# Patient Record
Sex: FEMALE | Race: WHITE | Employment: FULL TIME | URBAN - METROPOLITAN AREA
[De-identification: names, ages, dates, MRNs, and addresses within clinical notes are randomized per-mention and may not be internally consistent; named-entity substitution may affect disease eponyms.]

---

## 2017-05-05 PROBLEM — E66.9 OBESITY (BMI 30.0-34.9): Status: ACTIVE | Noted: 2017-05-05

## 2017-05-05 PROBLEM — E66.811 OBESITY (BMI 30.0-34.9): Status: ACTIVE | Noted: 2017-05-05

## 2024-01-29 RX ORDER — ZONISAMIDE 50 MG/1
CAPSULE ORAL
Qty: 90 CAPSULE | Refills: 1 | Status: SHIPPED | OUTPATIENT
Start: 2024-01-29

## 2024-01-29 NOTE — TELEPHONE ENCOUNTER
Leisa Shrestha is calling to request a refill on the following medication(s):    Medication Request:  Requested Prescriptions     Pending Prescriptions Disp Refills    zonisamide (ZONEGRAN) 50 MG capsule [Pharmacy Med Name: ZONISAMIDE 50 MG CAPSULE] 90 capsule      Sig: TAKE ONE CAPSULE BY MOUTH DAILY FOR MIGRAINES       Last Visit Date (If Applicable):  Visit date not found    Next Visit Date:    Visit date not found

## 2024-04-22 ENCOUNTER — OFFICE VISIT (OUTPATIENT)
Age: 49
End: 2024-04-22

## 2024-04-22 VITALS
OXYGEN SATURATION: 100 % | DIASTOLIC BLOOD PRESSURE: 84 MMHG | HEIGHT: 65 IN | SYSTOLIC BLOOD PRESSURE: 124 MMHG | WEIGHT: 116 LBS | BODY MASS INDEX: 19.33 KG/M2 | HEART RATE: 68 BPM

## 2024-04-22 DIAGNOSIS — G57.02 PIRIFORMIS SYNDROME OF LEFT SIDE: ICD-10-CM

## 2024-04-22 DIAGNOSIS — Z12.11 SCREENING FOR COLON CANCER: ICD-10-CM

## 2024-04-22 DIAGNOSIS — M53.3 SACRAL PAIN: ICD-10-CM

## 2024-04-22 DIAGNOSIS — M54.16 ACUTE LEFT LUMBAR RADICULOPATHY: Primary | ICD-10-CM

## 2024-04-22 DIAGNOSIS — Z00.00 HEALTH MAINTENANCE EXAMINATION: ICD-10-CM

## 2024-04-22 DIAGNOSIS — E55.9 VITAMIN D DEFICIENCY: ICD-10-CM

## 2024-04-22 RX ORDER — SEMAGLUTIDE 0.25 MG/.5ML
0.25 INJECTION, SOLUTION SUBCUTANEOUS
COMMUNITY

## 2024-04-22 RX ORDER — PREDNISONE 20 MG/1
TABLET ORAL
Qty: 18 TABLET | Refills: 0 | Status: SHIPPED | OUTPATIENT
Start: 2024-04-22

## 2024-04-22 RX ORDER — CYCLOBENZAPRINE HCL 5 MG
5 TABLET ORAL 3 TIMES DAILY PRN
Qty: 30 TABLET | Refills: 0 | Status: SHIPPED | OUTPATIENT
Start: 2024-04-22 | End: 2024-05-02

## 2024-04-22 RX ORDER — METHYLPREDNISOLONE 4 MG/1
TABLET ORAL
COMMUNITY
Start: 2024-04-15

## 2024-04-22 RX ORDER — CETIRIZINE HYDROCHLORIDE 10 MG/1
10 TABLET ORAL DAILY
COMMUNITY

## 2024-04-22 RX ORDER — DOCUSATE SODIUM 100 MG/1
100 CAPSULE, LIQUID FILLED ORAL 2 TIMES DAILY
COMMUNITY

## 2024-04-22 SDOH — ECONOMIC STABILITY: FOOD INSECURITY: WITHIN THE PAST 12 MONTHS, YOU WORRIED THAT YOUR FOOD WOULD RUN OUT BEFORE YOU GOT MONEY TO BUY MORE.: NEVER TRUE

## 2024-04-22 SDOH — ECONOMIC STABILITY: FOOD INSECURITY: WITHIN THE PAST 12 MONTHS, THE FOOD YOU BOUGHT JUST DIDN'T LAST AND YOU DIDN'T HAVE MONEY TO GET MORE.: NEVER TRUE

## 2024-04-22 SDOH — ECONOMIC STABILITY: HOUSING INSECURITY
IN THE LAST 12 MONTHS, WAS THERE A TIME WHEN YOU DID NOT HAVE A STEADY PLACE TO SLEEP OR SLEPT IN A SHELTER (INCLUDING NOW)?: NO

## 2024-04-22 SDOH — ECONOMIC STABILITY: INCOME INSECURITY: HOW HARD IS IT FOR YOU TO PAY FOR THE VERY BASICS LIKE FOOD, HOUSING, MEDICAL CARE, AND HEATING?: NOT HARD AT ALL

## 2024-04-22 ASSESSMENT — PATIENT HEALTH QUESTIONNAIRE - PHQ9
SUM OF ALL RESPONSES TO PHQ QUESTIONS 1-9: 0
1. LITTLE INTEREST OR PLEASURE IN DOING THINGS: NOT AT ALL
2. FEELING DOWN, DEPRESSED OR HOPELESS: NOT AT ALL
SUM OF ALL RESPONSES TO PHQ9 QUESTIONS 1 & 2: 0

## 2024-04-22 NOTE — PROGRESS NOTES
Reported on 4/22/2024)      Semaglutide-Weight Management (WEGOVY) 0.25 MG/0.5ML SOAJ SC injection Inject 0.25 mg into the skin every 7 days (Patient not taking: Reported on 4/22/2024)      Phentermine-Topiramate (QSYMIA) 3.75-23 MG CP24 Take 1 tablet by mouth daily (Patient not taking: Reported on 4/22/2024) 14 capsule 0     No current facility-administered medications for this visit.        Patient Active Problem List   Diagnosis    Obesity (BMI 30.0-34.9)       No past medical history on file.    No past surgical history on file.     Social History     Socioeconomic History    Marital status:    Tobacco Use    Smoking status: Never     Social Determinants of Health     Financial Resource Strain: Low Risk  (4/22/2024)    Overall Financial Resource Strain (CARDIA)     Difficulty of Paying Living Expenses: Not hard at all   Food Insecurity: No Food Insecurity (4/22/2024)    Hunger Vital Sign     Worried About Running Out of Food in the Last Year: Never true     Ran Out of Food in the Last Year: Never true   Transportation Needs: Unknown (4/22/2024)    PRAPARE - Transportation     Lack of Transportation (Non-Medical): No   Housing Stability: Unknown (4/22/2024)    Housing Stability Vital Sign     Unstable Housing in the Last Year: No        No family history on file.     PHYSICAL EXAM     /84 (Site: Left Upper Arm)   Pulse 68   Ht 1.638 m (5' 4.5\")   Wt 52.6 kg (116 lb)   SpO2 100%   BMI 19.60 kg/m²    Physical Exam  Constitutional:       Appearance: Normal appearance.   HENT:      Head: Normocephalic and atraumatic.      Right Ear: Tympanic membrane normal.   Eyes:      Extraocular Movements: Extraocular movements intact.      Pupils: Pupils are equal, round, and reactive to light.   Cardiovascular:      Rate and Rhythm: Normal rate and regular rhythm.      Pulses: Normal pulses.   Pulmonary:      Breath sounds: Normal breath sounds.   Abdominal:      General: Bowel sounds are normal.

## 2024-04-24 ASSESSMENT — ENCOUNTER SYMPTOMS
SHORTNESS OF BREATH: 0
CHEST TIGHTNESS: 0

## 2024-04-25 ENCOUNTER — PATIENT MESSAGE (OUTPATIENT)
Age: 49
End: 2024-04-25

## 2024-04-25 DIAGNOSIS — M53.3 SACRAL PAIN: ICD-10-CM

## 2024-04-25 DIAGNOSIS — M54.16 ACUTE LEFT LUMBAR RADICULOPATHY: ICD-10-CM

## 2024-04-25 DIAGNOSIS — G57.02 PIRIFORMIS SYNDROME OF LEFT SIDE: ICD-10-CM

## 2024-04-26 NOTE — TELEPHONE ENCOUNTER
From: Leisa Shrestha  To: Dr. Yahir Temple  Sent: 4/25/2024 8:43 PM EDT  Subject: MRI result    So what happens next, should I see someone for possible injections? What should be done to lessen the pain? Thanks

## 2024-06-17 RX ORDER — ZONISAMIDE 50 MG/1
CAPSULE ORAL
Qty: 90 CAPSULE | Refills: 1 | Status: SHIPPED | OUTPATIENT
Start: 2024-06-17

## 2024-06-17 NOTE — TELEPHONE ENCOUNTER
Leisa Shrestha is calling to request a refill on the following medication(s):    Medication Request:  Requested Prescriptions     Pending Prescriptions Disp Refills    zonisamide (ZONEGRAN) 50 MG capsule [Pharmacy Med Name: ZONISAMIDE 50 MG CAPSULE] 90 capsule 1     Sig: TAKE 1 CAPSULE BY MOUTH DAILY FOR MIGRIAINES       Last Visit Date (If Applicable):  4/22/2024    Next Visit Date:    Visit date not found

## 2025-07-07 RX ORDER — ZONISAMIDE 50 MG/1
CAPSULE ORAL
Qty: 90 CAPSULE | Refills: 1 | Status: SHIPPED | OUTPATIENT
Start: 2025-07-07

## 2025-07-07 NOTE — TELEPHONE ENCOUNTER
Leisa Shrestha is calling to request a refill on the following medication(s):    Medication Request:  Requested Prescriptions     Pending Prescriptions Disp Refills    zonisamide (ZONEGRAN) 50 MG capsule [Pharmacy Med Name: ZONISAMIDE 50 MG CAPSULE] 90 capsule 1     Sig: TAKE 1 CAPSULE BY MOUTH DAILY FOR MIGRAINES       Last Visit Date (If Applicable):  4/22/2024    Next Visit Date:    Visit date not found